# Patient Record
Sex: MALE | Race: OTHER | URBAN - METROPOLITAN AREA
[De-identification: names, ages, dates, MRNs, and addresses within clinical notes are randomized per-mention and may not be internally consistent; named-entity substitution may affect disease eponyms.]

---

## 2021-05-18 ENCOUNTER — HOSPITAL ENCOUNTER (INPATIENT)
Age: 31
LOS: 1 days | Discharge: HOME | DRG: 310 | End: 2021-05-19
Attending: EMERGENCY MEDICINE | Admitting: FAMILY MEDICINE

## 2021-05-18 ENCOUNTER — APPOINTMENT (OUTPATIENT)
Dept: GENERAL RADIOLOGY | Age: 31
DRG: 310 | End: 2021-05-18
Attending: PHYSICIAN ASSISTANT

## 2021-05-18 ENCOUNTER — APPOINTMENT (OUTPATIENT)
Dept: CT IMAGING | Age: 31
DRG: 310 | End: 2021-05-18
Attending: PHYSICIAN ASSISTANT

## 2021-05-18 DIAGNOSIS — R55 SYNCOPE AND COLLAPSE: ICD-10-CM

## 2021-05-18 DIAGNOSIS — R07.9 CHEST PAIN, UNSPECIFIED TYPE: Primary | ICD-10-CM

## 2021-05-18 LAB
ALBUMIN SERPL-MCNC: 4.3 G/DL (ref 3.5–5)
ALBUMIN/GLOB SERPL: 1.1 {RATIO} (ref 1.1–2.2)
ALP SERPL-CCNC: 98 U/L (ref 45–117)
ALT SERPL-CCNC: 42 U/L (ref 12–78)
ANION GAP SERPL CALC-SCNC: 8 MMOL/L (ref 5–15)
AST SERPL-CCNC: 29 U/L (ref 15–37)
ATRIAL RATE: 85 BPM
BASOPHILS # BLD: 0.1 K/UL (ref 0–0.1)
BASOPHILS NFR BLD: 1 % (ref 0–1)
BILIRUB SERPL-MCNC: 0.7 MG/DL (ref 0.2–1)
BUN SERPL-MCNC: 16 MG/DL (ref 6–20)
BUN/CREAT SERPL: 15 (ref 12–20)
CALCIUM SERPL-MCNC: 9.3 MG/DL (ref 8.5–10.1)
CALCULATED P AXIS, ECG09: 34 DEGREES
CALCULATED R AXIS, ECG10: 60 DEGREES
CALCULATED T AXIS, ECG11: 20 DEGREES
CHLORIDE SERPL-SCNC: 103 MMOL/L (ref 97–108)
CK SERPL-CCNC: 221 U/L (ref 39–308)
CO2 SERPL-SCNC: 25 MMOL/L (ref 21–32)
COMMENT, HOLDF: NORMAL
CREAT SERPL-MCNC: 1.08 MG/DL (ref 0.7–1.3)
DIAGNOSIS, 93000: NORMAL
DIFFERENTIAL METHOD BLD: ABNORMAL
EOSINOPHIL # BLD: 0 K/UL (ref 0–0.4)
EOSINOPHIL NFR BLD: 0 % (ref 0–7)
ERYTHROCYTE [DISTWIDTH] IN BLOOD BY AUTOMATED COUNT: 11.9 % (ref 11.5–14.5)
GLOBULIN SER CALC-MCNC: 3.9 G/DL (ref 2–4)
GLUCOSE SERPL-MCNC: 100 MG/DL (ref 65–100)
HCT VFR BLD AUTO: 45.4 % (ref 36.6–50.3)
HGB BLD-MCNC: 16.1 G/DL (ref 12.1–17)
IMM GRANULOCYTES # BLD AUTO: 0 K/UL (ref 0–0.04)
IMM GRANULOCYTES NFR BLD AUTO: 0 % (ref 0–0.5)
LYMPHOCYTES # BLD: 1.8 K/UL (ref 0.8–3.5)
LYMPHOCYTES NFR BLD: 17 % (ref 12–49)
MCH RBC QN AUTO: 31.6 PG (ref 26–34)
MCHC RBC AUTO-ENTMCNC: 35.5 G/DL (ref 30–36.5)
MCV RBC AUTO: 89 FL (ref 80–99)
MONOCYTES # BLD: 0.6 K/UL (ref 0–1)
MONOCYTES NFR BLD: 6 % (ref 5–13)
NEUTS SEG # BLD: 7.9 K/UL (ref 1.8–8)
NEUTS SEG NFR BLD: 76 % (ref 32–75)
NRBC # BLD: 0 K/UL (ref 0–0.01)
NRBC BLD-RTO: 0 PER 100 WBC
P-R INTERVAL, ECG05: 138 MS
PLATELET # BLD AUTO: 217 K/UL (ref 150–400)
PMV BLD AUTO: 10.1 FL (ref 8.9–12.9)
POTASSIUM SERPL-SCNC: 3.4 MMOL/L (ref 3.5–5.1)
PROT SERPL-MCNC: 8.2 G/DL (ref 6.4–8.2)
Q-T INTERVAL, ECG07: 350 MS
QRS DURATION, ECG06: 92 MS
QTC CALCULATION (BEZET), ECG08: 416 MS
RBC # BLD AUTO: 5.1 M/UL (ref 4.1–5.7)
SAMPLES BEING HELD,HOLD: NORMAL
SODIUM SERPL-SCNC: 136 MMOL/L (ref 136–145)
TROPONIN I SERPL-MCNC: <0.05 NG/ML
VENTRICULAR RATE, ECG03: 85 BPM
WBC # BLD AUTO: 10.5 K/UL (ref 4.1–11.1)

## 2021-05-18 PROCEDURE — 84484 ASSAY OF TROPONIN QUANT: CPT

## 2021-05-18 PROCEDURE — 82550 ASSAY OF CK (CPK): CPT

## 2021-05-18 PROCEDURE — 74011250637 HC RX REV CODE- 250/637: Performed by: PHYSICIAN ASSISTANT

## 2021-05-18 PROCEDURE — 80053 COMPREHEN METABOLIC PANEL: CPT

## 2021-05-18 PROCEDURE — 70450 CT HEAD/BRAIN W/O DYE: CPT

## 2021-05-18 PROCEDURE — 71046 X-RAY EXAM CHEST 2 VIEWS: CPT

## 2021-05-18 PROCEDURE — 74011000636 HC RX REV CODE- 636: Performed by: EMERGENCY MEDICINE

## 2021-05-18 PROCEDURE — 71275 CT ANGIOGRAPHY CHEST: CPT

## 2021-05-18 PROCEDURE — 74011250636 HC RX REV CODE- 250/636: Performed by: PHYSICIAN ASSISTANT

## 2021-05-18 PROCEDURE — 93005 ELECTROCARDIOGRAM TRACING: CPT

## 2021-05-18 PROCEDURE — 36415 COLL VENOUS BLD VENIPUNCTURE: CPT

## 2021-05-18 PROCEDURE — 85025 COMPLETE CBC W/AUTO DIFF WBC: CPT

## 2021-05-18 PROCEDURE — 99285 EMERGENCY DEPT VISIT HI MDM: CPT

## 2021-05-18 RX ORDER — ACETAMINOPHEN 325 MG/1
650 TABLET ORAL
Status: COMPLETED | OUTPATIENT
Start: 2021-05-18 | End: 2021-05-18

## 2021-05-18 RX ADMIN — ACETAMINOPHEN 650 MG: 325 TABLET ORAL at 19:53

## 2021-05-18 RX ADMIN — IOPAMIDOL 100 ML: 755 INJECTION, SOLUTION INTRAVENOUS at 15:42

## 2021-05-18 RX ADMIN — SODIUM CHLORIDE 1000 ML: 9 INJECTION, SOLUTION INTRAVENOUS at 16:06

## 2021-05-18 NOTE — ED PROVIDER NOTES
Sofia Hanson is a 32 y.o. male without PMhx who presents via EMS to ED with cc of 6/10 R sided CP x today, approx 10 minutes after eating greasy lunch from a food truck. Went back to work despite CP, notes he was in the sun, states he felt poorly and felt like his vision went black. States he was able to sit down and his coworkers helped him not fall. Denies head injury or neck injury. States he lost consciousness for short amount of time. Denies nausea, vomiting, palpitations, loss of control of bowels or bladder. Unsure if he had any seizure like activity. Endorses generalized myalgias, some mild SOB and some R sided CP currently. Notes current pain is improved from previous. States he had a similar episode in the past witnessed by his sister. Pt denies additional symptoms of F/C, cough, congestion, urinary or fecal changes, visual changes, neck pain, wound. PMHx: Pt denies. PSHx: Pt denies. PCP: No primary care provider on file. There are no other complaints verbalized at this time. : [id number]           History reviewed. No pertinent past medical history. History reviewed. No pertinent surgical history. History reviewed. No pertinent family history.     Social History     Socioeconomic History    Marital status: SINGLE     Spouse name: Not on file    Number of children: Not on file    Years of education: Not on file    Highest education level: Not on file   Occupational History    Not on file   Social Needs    Financial resource strain: Not on file    Food insecurity     Worry: Not on file     Inability: Not on file    Transportation needs     Medical: Not on file     Non-medical: Not on file   Tobacco Use    Smoking status: Not on file   Substance and Sexual Activity    Alcohol use: Not on file    Drug use: Not on file    Sexual activity: Not on file   Lifestyle    Physical activity     Days per week: Not on file     Minutes per session: Not on file    Stress: Not on file   Relationships    Social connections     Talks on phone: Not on file     Gets together: Not on file     Attends Moravian service: Not on file     Active member of club or organization: Not on file     Attends meetings of clubs or organizations: Not on file     Relationship status: Not on file    Intimate partner violence     Fear of current or ex partner: Not on file     Emotionally abused: Not on file     Physically abused: Not on file     Forced sexual activity: Not on file   Other Topics Concern    Not on file   Social History Narrative    Not on file         ALLERGIES: Patient has no known allergies. Review of Systems   Constitutional: Negative for chills and fever. HENT: Negative for congestion. Eyes: Negative for visual disturbance. Respiratory: Positive for shortness of breath. Negative for cough. Cardiovascular: Positive for chest pain. Negative for palpitations. Gastrointestinal: Negative for constipation and diarrhea. Genitourinary: Negative for difficulty urinating. Musculoskeletal: Negative for neck pain. Skin: Negative for wound. Neurological: Positive for syncope (possible LOC) and light-headedness. All other systems reviewed and are negative. Vitals:    05/18/21 1357 05/18/21 1844   BP: 132/86 134/80   Pulse: 88 66   Resp: 15 15   Temp: 98.4 °F (36.9 °C)    SpO2: 96% 100%            Physical Exam  Vitals signs and nursing note reviewed. Constitutional:       General: He is not in acute distress. Appearance: He is not diaphoretic. HENT:      Head: Normocephalic. Comments: No ttp head     Right Ear: External ear normal.      Left Ear: External ear normal.      Mouth/Throat:      Mouth: Mucous membranes are moist.   Eyes:      General: No scleral icterus. Extraocular Movements: Extraocular movements intact. Conjunctiva/sclera: Conjunctivae normal.      Pupils: Pupils are equal, round, and reactive to light.    Neck:      Musculoskeletal: Normal range of motion and neck supple. No neck rigidity or muscular tenderness. Cardiovascular:      Rate and Rhythm: Normal rate and regular rhythm. Pulses: Normal pulses. Heart sounds: Normal heart sounds. No murmur. Comments: 2+ radial and posterior tibial pulses  Pulmonary:      Effort: Pulmonary effort is normal. No respiratory distress. Breath sounds: Normal breath sounds. No stridor. No wheezing, rhonchi or rales. Chest:      Chest wall: No tenderness. Abdominal:      General: Bowel sounds are normal. There is no distension. Palpations: Abdomen is soft. There is no mass. Tenderness: There is no abdominal tenderness. There is no guarding or rebound. Hernia: No hernia is present. Musculoskeletal:         General: No swelling or deformity. Skin:     General: Skin is warm and dry. Capillary Refill: Capillary refill takes less than 2 seconds. Neurological:      General: No focal deficit present. Mental Status: He is alert and oriented to person, place, and time. Mental status is at baseline. GCS: GCS eye subscore is 4. GCS verbal subscore is 5. GCS motor subscore is 6. Cranial Nerves: No cranial nerve deficit (2-12), dysarthria or facial asymmetry. Sensory: No sensory deficit (grossly intact to BLUE and BLLE). Motor: No weakness (5/5 strength to biceps, triceps,  strength, flexion/extension knees and ankles), tremor, abnormal muscle tone or seizure activity. Coordination: Heel to Shin Test normal.          MDM  Number of Diagnoses or Management Options     Amount and/or Complexity of Data Reviewed  Clinical lab tests: ordered and reviewed  Tests in the radiology section of CPT®: ordered and reviewed  Tests in the medicine section of CPT®: ordered and reviewed  Discuss the patient with other providers: yes (Dr Savanah Morales, ED attending)           Procedures          3:24 PM  Spoke with Dr Savanah Morales regarding story.  He recommends adding on CTA chest.      4:52 PM  Pt re-evaluated. He is resting comfortably, fluids are running.      5:26 PM  Called the lab with note that CK and Troponin are acknowledged but have not yet resulted. They state that neither have been received by processing. States she will take care of that now. 7:37 PM  In to reevaluate patient. He states his chest pain has resolved and now just endorses some generalized myalgias.  #[id number] use during evaluation. Perfect Serve Consult for Admission  7:38 PM    ED Room Number: R33/R33  Patient Name and age:  Marley Born 32 y.o.  male  Working Diagnosis:   1. Chest pain, unspecified type    2. Syncope and collapse        COVID-19 Suspicion:  no  Sepsis present:  no  Reassessment needed: yes  Code Status:  Full Code  Readmission: no  Isolation Requirements:  no  Recommended Level of Care:  telemetry  Department:[facility] Adult ED - 21   Other: 26-year-old male, denies past medical history. Was working earlier today, had onset of R-sided chest pain after lunch, went back to work and had syncopal episode. No loss of bowel or bladder control with syncopal episode. Continued to have CP for which he came to ED. Notes history of similar in the past for which he has not had evaluation/diagnosis. Currently notes pain is improved and just endorses myalgias. EKG, CBC, CMP, CK, troponin, head CT without, CTA obtained. Was given liter of fluids. Spoke with attending who recommends admission given combination of chest pain and syncope.         VITAL SIGNS:  Vitals:    05/18/21 1357 05/18/21 1844   BP: 132/86 134/80   Pulse: 88 66   Resp: 15 15   Temp: 98.4 °F (36.9 °C)    SpO2: 96% 100%         LABS:  Recent Results (from the past 24 hour(s))   EKG, 12 LEAD, INITIAL    Collection Time: 05/18/21  2:31 PM   Result Value Ref Range    Ventricular Rate 85 BPM    Atrial Rate 85 BPM    P-R Interval 138 ms    QRS Duration 92 ms    Q-T Interval 350 ms    QTC Calculation (Bezet) 416 ms    Calculated P Axis 34 degrees    Calculated R Axis 60 degrees    Calculated T Axis 20 degrees    Diagnosis       Normal sinus rhythm  No previous ECGs available  Confirmed by Adair Kee M.D., Arlice Chase ([id number]) on 5/18/2021 4:58:20 PM     CBC WITH AUTOMATED DIFF    Collection Time: 05/18/21  2:33 PM   Result Value Ref Range    WBC 10.5 4.1 - 11.1 K/uL    RBC 5.10 4. 10 - 5.70 M/uL    HGB 16.1 12.1 - 17.0 g/dL    HCT 45.4 36.6 - 50.3 %    MCV 89.0 80.0 - 99.0 FL    MCH 31.6 26.0 - 34.0 PG    MCHC 35.5 30.0 - 36.5 g/dL    RDW 11.9 11.5 - 14.5 %    PLATELET 807 030 - 080 K/uL    MPV 10.1 8.9 - 12.9 FL    NRBC 0.0 0  WBC    ABSOLUTE NRBC 0.00 0.00 - 0.01 K/uL    NEUTROPHILS 76 (H) 32 - 75 %    LYMPHOCYTES 17 12 - 49 %    MONOCYTES 6 5 - 13 %    EOSINOPHILS 0 0 - 7 %    BASOPHILS 1 0 - 1 %    IMMATURE GRANULOCYTES 0 0.0 - 0.5 %    ABS. NEUTROPHILS 7.9 1.8 - 8.0 K/UL    ABS. LYMPHOCYTES 1.8 0.8 - 3.5 K/UL    ABS. MONOCYTES 0.6 0.0 - 1.0 K/UL    ABS. EOSINOPHILS 0.0 0.0 - 0.4 K/UL    ABS. BASOPHILS 0.1 0.0 - 0.1 K/UL    ABS. IMM. GRANS. 0.0 0.00 - 0.04 K/UL    DF AUTOMATED     METABOLIC PANEL, COMPREHENSIVE    Collection Time: 05/18/21  2:33 PM   Result Value Ref Range    Sodium 136 136 - 145 mmol/L    Potassium 3.4 (L) 3.5 - 5.1 mmol/L    Chloride 103 97 - 108 mmol/L    CO2 25 21 - 32 mmol/L    Anion gap 8 5 - 15 mmol/L    Glucose 100 65 - 100 mg/dL    BUN 16 6 - 20 MG/DL    Creatinine 1.08 0.70 - 1.30 MG/DL    BUN/Creatinine ratio 15 12 - 20      GFR est AA >60 >60 ml/min/1.73m2    GFR est non-AA >60 >60 ml/min/1.73m2    Calcium 9.3 8.5 - 10.1 MG/DL    Bilirubin, total 0.7 0.2 - 1.0 MG/DL    ALT (SGPT) 42 12 - 78 U/L    AST (SGOT) 29 15 - 37 U/L    Alk.  phosphatase 98 45 - 117 U/L    Protein, total 8.2 6.4 - 8.2 g/dL    Albumin 4.3 3.5 - 5.0 g/dL    Globulin 3.9 2.0 - 4.0 g/dL    A-G Ratio 1.1 1.1 - 2.2     SAMPLES BEING HELD    Collection Time: 05/18/21  2:33 PM   Result Value Ref Range    SAMPLES BEING HELD 1 red, 1 daisy     COMMENT        Add-on orders for these samples will be processed based on acceptable specimen integrity and analyte stability, which may vary by analyte. TROPONIN I    Collection Time: 05/18/21  3:15 PM   Result Value Ref Range    Troponin-I, Qt. <0.05 <0.05 ng/mL   CK W/ REFLX CKMB    Collection Time: 05/18/21  3:15 PM   Result Value Ref Range     39 - 308 U/L         IMAGING:  CT HEAD WO CONT   Final Result   No acute intracranial finding. CTA CHEST W OR W WO CONT   Final Result   1. No pulmonary emboli. 2. No acute finding. XR CHEST PA LAT   Final Result   Normal chest.            Medications During Visit:  Medications   sodium chloride 0.9 % bolus infusion 1,000 mL (0 mL IntraVENous IV Completed 5/18/21 1800)   iopamidoL (ISOVUE-370) 76 % injection 100 mL (100 mL IntraVENous Given 5/18/21 1542)   acetaminophen (TYLENOL) tablet 650 mg (650 mg Oral Given 5/18/21 1953)         DECISION MAKING:    Elizabeth Jorgensen is a 32 y.o. male without major PMHx reported who comes in as above. He presents afebrile and hemodynamically stable. Had onset of R sided chest pain after eating lunch and then had loss of consciousness. Denies fall, head injury or neck injury. Upon presentation, endorses continued R sided CP and generalized myalgias. Encouraging neuro exam on PE and he has no chest or abd ttp, is able to take deep inspirations without difficulty and has stable vital signs. CBC, CMP obtained without acute electrolyte, anemia or other etiology. CK obtained secondary generalized myalgias, however no elevation noted, he has been given with fluids while in ED since he stated he was in the sun earlier. Troponin without elevation and EKG without acute arrythmia or other etiology noted. CT head without apparent mass, bleed, fluid collection or shift. CTA chest obtained secondary to combination of CP and syncope to r/o PE, demonstrating no PE or other acute cardiopulmonary finding.  Have spoke with ED attending, Dr Solomon Clark who recommends admission given combination of syncope and chest pain. Spoke with patient who is agreeable. Hospitalist consulted. IMPRESSION:  1. Chest pain, unspecified type    2. Syncope and collapse        DISPOSITION:  Admission. Please note that this dictation was completed with [employer], the computer voice recognition software. Quite often unanticipated grammatical, syntax, homophones, and other interpretive errors are inadvertently transcribed by the computer software. Please disregard these errors. Please excuse any errors that have escaped final proofreading.

## 2021-05-18 NOTE — ED TRIAGE NOTES
Patient presents from work via EMS with complaints of right side chest pain that statared after he ate lunch today. Patient reports he has had this issue for about a year off and on. Patient reports he ate greasy food from a food truck.  Denies SOB, N/V/D

## 2021-05-19 ENCOUNTER — APPOINTMENT (OUTPATIENT)
Dept: NON INVASIVE DIAGNOSTICS | Age: 31
DRG: 310 | End: 2021-05-19
Attending: FAMILY MEDICINE

## 2021-05-19 VITALS
DIASTOLIC BLOOD PRESSURE: 95 MMHG | TEMPERATURE: 98.4 F | RESPIRATION RATE: 22 BRPM | SYSTOLIC BLOOD PRESSURE: 162 MMHG | HEART RATE: 77 BPM | OXYGEN SATURATION: 94 %

## 2021-05-19 PROBLEM — R55 SYNCOPE: Status: ACTIVE | Noted: 2021-05-19

## 2021-05-19 PROCEDURE — 65660000000 HC RM CCU STEPDOWN

## 2021-05-19 PROCEDURE — 95816 EEG AWAKE AND DROWSY: CPT | Performed by: INTERNAL MEDICINE

## 2021-05-19 PROCEDURE — 74011250636 HC RX REV CODE- 250/636: Performed by: FAMILY MEDICINE

## 2021-05-19 PROCEDURE — 95819 EEG AWAKE AND ASLEEP: CPT | Performed by: PSYCHIATRY & NEUROLOGY

## 2021-05-19 RX ORDER — ACETAMINOPHEN 650 MG/1
650 SUPPOSITORY RECTAL
Status: DISCONTINUED | OUTPATIENT
Start: 2021-05-19 | End: 2021-05-19 | Stop reason: HOSPADM

## 2021-05-19 RX ORDER — POLYETHYLENE GLYCOL 3350 17 G/17G
17 POWDER, FOR SOLUTION ORAL DAILY PRN
Status: DISCONTINUED | OUTPATIENT
Start: 2021-05-19 | End: 2021-05-19 | Stop reason: HOSPADM

## 2021-05-19 RX ORDER — SODIUM CHLORIDE 0.9 % (FLUSH) 0.9 %
5-40 SYRINGE (ML) INJECTION AS NEEDED
Status: DISCONTINUED | OUTPATIENT
Start: 2021-05-19 | End: 2021-05-19 | Stop reason: HOSPADM

## 2021-05-19 RX ORDER — SODIUM CHLORIDE 0.9 % (FLUSH) 0.9 %
5-40 SYRINGE (ML) INJECTION EVERY 8 HOURS
Status: DISCONTINUED | OUTPATIENT
Start: 2021-05-19 | End: 2021-05-19 | Stop reason: HOSPADM

## 2021-05-19 RX ORDER — ENOXAPARIN SODIUM 100 MG/ML
40 INJECTION SUBCUTANEOUS DAILY
Status: DISCONTINUED | OUTPATIENT
Start: 2021-05-19 | End: 2021-05-19 | Stop reason: HOSPADM

## 2021-05-19 RX ORDER — ONDANSETRON 2 MG/ML
4 INJECTION INTRAMUSCULAR; INTRAVENOUS
Status: DISCONTINUED | OUTPATIENT
Start: 2021-05-19 | End: 2021-05-19 | Stop reason: HOSPADM

## 2021-05-19 RX ORDER — ACETAMINOPHEN 325 MG/1
650 TABLET ORAL
Status: DISCONTINUED | OUTPATIENT
Start: 2021-05-19 | End: 2021-05-19 | Stop reason: HOSPADM

## 2021-05-19 RX ORDER — PROMETHAZINE HYDROCHLORIDE 25 MG/1
12.5 TABLET ORAL
Status: DISCONTINUED | OUTPATIENT
Start: 2021-05-19 | End: 2021-05-19 | Stop reason: HOSPADM

## 2021-05-19 RX ADMIN — Medication 10 ML: at 06:00

## 2021-05-19 RX ADMIN — Medication 10 ML: at 01:00

## 2021-05-19 NOTE — PROGRESS NOTES
Patient is requesting to leave against medical advice. Patient refuses all further meds/laboratory draws/diagnostic tests. MD notified. Patient is requesting to leave due to not being able to afford the hospital bill. Case management at bedside offering resources.  Patient still wanting to leave by no later than 1pm.

## 2021-05-19 NOTE — PROGRESS NOTES
TRANSITION OF CARE  RUR--&%  Disposition--Patient plans to leave AMA. Return home to independent functioning. No discharge planning needs presented at this time. Transport--will drive himself. Patient's primary family contact: sister Jamilah Ledesma cell [phone]    Care Management Interventions  PCP Verified by CM: No  Transition of Care Consult (CM Consult): Other (None)  Physical Therapy Consult: No  Occupational Therapy Consult: No  Speech Therapy Consult: No  Current Support Network: Lives Alone  The Plan for Transition of Care is Related to the Following Treatment Goals : Return to independent functioning. Discharge Location  Discharge Placement: Home    Reason for Admission:  Atypical CP --resolved. Possible seizure disorder. RUR Score:            7%           Plan for utilizing home health:    None      PCP: First and Last name:  None   Name of Practice:    Are you a current patient: Yes/No:    Approximate date of last visit:    Can you participate in a virtual visit with your PCP:                     Current Advanced Directive/Advance Care Plan: Full Code      Healthcare Decision Maker:   Click here to complete [address] including selection of the Healthcare Decision Maker Relationship (ie \"Primary\")                             Transition of Care Plan:                    CM and staff nurse spoke with patient utilizing [facility]  via [employer]. Patient lives in [place] where his family/socvial support is located. Patient reports good family /social support. Patient is ambulatory and expects return to independent functioning and employment. Patient does not have health insurance nor primary care. Patient said that his employer did not offer him health insurance. CM recommended patient access a free clinic in his area of [place].    CM provided patient with a copy of the Financial Aide Application for the [facility] Card (Bengali version), as well as a brief explanation of where to call if assistance is needed completing the form and where to mail the form when completed.

## 2021-05-19 NOTE — ROUTINE PROCESS
TRANSFER - OUT REPORT: 
 
Verbal report given to NELIDA Cabrales(name) on [place]  being transferred to [address] [place] [address](unit) for routine progression of care Report consisted of patients Situation, Background, Assessment and  
Recommendations(SBAR). Information from the following report(s) SBAR, ED Summary, Intake/Output, MAR and Recent Results was reviewed with the receiving nurse. Lines:  
Peripheral IV 05/18/21 Left Arm (Active) Site Assessment Clean, dry, & intact 05/18/21 1436 Phlebitis Assessment 0 05/18/21 1436 Infiltration Assessment 0 05/18/21 1436 Dressing Status Clean, dry, & intact 05/18/21 1436 Dressing Type Transparent 05/18/21 1436 Hub Color/Line Status Pink 05/18/21 1436 Opportunity for questions and clarification was provided. Patient transported with: transport

## 2021-05-19 NOTE — PROGRESS NOTES
Problem: Falls - Risk of  Goal: *Absence of Falls  Description: Document Nellie Gunderson Fall Risk and appropriate interventions in the flowsheet.   Outcome: Progressing Towards Goal  Note: Fall Risk Interventions:                                Problem: Patient Education: Go to Patient Education Activity  Goal: Patient/Family Education  Outcome: Progressing Towards Goal

## 2021-05-19 NOTE — PROCEDURES
[facility]  EEG    Name:  Fuentes Shipley  MR#:  [id number]  :  1990  ACCOUNT #:  [de-identified]  DATE OF SERVICE:  2021    REQUESTING PHYSICIAN:  Isabelle Cox MD    HISTORY:  The patient is a 70-year-old male who is being evaluated after an episode of loss of consciousness. DESCRIPTION:  This is an 18-channel EEG performed on an awake, drowsy and asleep patient. During wakefulness, the dominant posterior background rhythm consists of symmetric well-modulated medium voltage rhythms in the 8 Hz frequency range out of the posterior head region. Faster frequencies and muscle artifact are seen in the frontal areas. Drowsiness is characterized by slowing and vertex waves. Stage II non-REM sleep reveals symmetric sleep spindles. Photic stimulation elicits a symmetric driving response. Hyperventilation was not performed. EEG SUMMARY:  Normal study. CLINICAL INTERPRETATION:  This is a normal EEG during awake, drowsy and asleep states of the patient. No lateralizing or epileptiform features were noted.       MD ELY Macario/S_[email or web address]_/[email or web address]  D:  2021 13:36  T:  2021 15:42  JOB #:  [id number]

## 2021-05-19 NOTE — H&P
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 Seems the pt also cp initially that resolved quickly. No established medical condition no meds. Smoke a little. [place] no alcohol, no know COVID exposure, no friends only co-workers and not sure if exposed to COVID      No fever, no chills, no cough, no sob; no n/v, no urine  incontinence        Prior to Admission Med List  None       SH:  Social History     Socioeconomic History    Marital status: SINGLE     Spouse name: Not on file    Number of children: Not on file    Years of education: Not on file    Highest education level: Not on file   Occupational History    Not on file   Tobacco Use    Smoking status: Not on file   Substance and Sexual Activity    Alcohol use: Not on file    Drug use: Not on file    Sexual activity: Not on file   Other Topics Concern    Not on file   Social History Narrative    Not on file     Social Determinants of Health     Financial Resource Strain:     Difficulty of Paying Living Expenses:    Food Insecurity:     Worried About Running Out of Food in the Last Year:     [address] in the Last Year:    Transportation Needs:     Lack of Transportation (Medical):      Lack of Transportation (Non-Medical):    Physical Activity:     Days of Exercise per Week:     Minutes of Exercise per Session:    Stress:     Feeling of Stress :    Social Connections:     Frequency of Communication with Friends and Family:     Frequency of Social Gatherings with Friends and Family:     Attends Episcopal Services:     Active Member of Clubs or Organizations:     Attends Club or Organization Meetings:     Marital Status:    Intimate Partner Violence:     Fear of Current or Ex-Partner:     Emotionally Abused:     Physically Abused:     Sexually Abused:           PMH:     History reviewed. No pertinent past medical history. Medicine PTA:     No medications prior to admission. ROS:   Review of Systems   Constitutional: Negative. HENT: Negative. Respiratory: Negative. Cardiovascular: Negative. Gastrointestinal: Negative. Genitourinary: Negative. Musculoskeletal: Negative. Skin: Negative. Neurological: Negative. Psychiatric/Behavioral: Negative. PE:     Visit Vitals  /64 (BP 1 Location: Right upper arm, BP Patient Position: Standing)   Pulse 86   Temp 98.4 °F (36.9 °C)   Resp 18   SpO2 95%        Physical Exam  Constitutional:       Appearance: Normal appearance. He is obese. HENT:      Head: Normocephalic and atraumatic. Right Ear: External ear normal.      Left Ear: External ear normal.      Nose: Nose normal.      Mouth/Throat:      Mouth: Mucous membranes are moist.      Pharynx: Oropharynx is clear. Eyes:      Extraocular Movements: Extraocular movements intact. Conjunctiva/sclera: Conjunctivae normal.      Pupils: Pupils are equal, round, and reactive to light. Cardiovascular:      Rate and Rhythm: Normal rate and regular rhythm. Pulses: Normal pulses. Heart sounds: Normal heart sounds. Pulmonary:      Effort: Pulmonary effort is normal.      Breath sounds: Normal breath sounds. Abdominal:      General: Abdomen is flat. There is no distension. Palpations: Abdomen is soft. Musculoskeletal:         General: No swelling or tenderness. Cervical back: Normal range of motion and neck supple. Skin:     General: Skin is warm and dry. Neurological:      General: No focal deficit present. Mental Status: He is oriented to person, place, and time. Mental status is at baseline.    Psychiatric: Mood and Affect: Mood normal.         Thought Content: Thought content normal.            Data:   Lab Results   Component Value Date/Time    WBC 10.5 05/18/2021 02:33 PM    HGB 16.1 05/18/2021 02:33 PM    HCT 45.4 05/18/2021 02:33 PM    PLATELET 641 44/77/1351 02:33 PM    MCV 89.0 05/18/2021 02:33 PM        Lab Results   Component Value Date/Time    Sodium 136 05/18/2021 02:33 PM    Potassium 3.4 (L) 05/18/2021 02:33 PM    Chloride 103 05/18/2021 02:33 PM    CO2 25 05/18/2021 02:33 PM    Anion gap 8 05/18/2021 02:33 PM    Glucose 100 05/18/2021 02:33 PM    BUN 16 05/18/2021 02:33 PM    Creatinine 1.08 05/18/2021 02:33 PM    BUN/Creatinine ratio 15 05/18/2021 02:33 PM    GFR est AA >60 05/18/2021 02:33 PM    GFR est non-AA >60 05/18/2021 02:33 PM    Calcium 9.3 05/18/2021 02:33 PM    Bilirubin, total 0.7 05/18/2021 02:33 PM    Alk. phosphatase 98 05/18/2021 02:33 PM    Protein, total 8.2 05/18/2021 02:33 PM    Albumin 4.3 05/18/2021 02:33 PM    Globulin 3.9 05/18/2021 02:33 PM    A-G Ratio 1.1 05/18/2021 02:33 PM    ALT (SGPT) 42 05/18/2021 02:33 PM        EKG Results     Procedure 720 Value Units Date/Time    EKG 12 LEAD INITIAL [[id number]] Collected: 05/18/21 1431    Order Status: Completed Updated: 05/18/21 1658     Ventricular Rate 85 BPM      Atrial Rate 85 BPM      P-R Interval 138 ms      QRS Duration 92 ms      Q-T Interval 350 ms      QTC Calculation (Bezet) 416 ms      Calculated P Axis 34 degrees      Calculated R Axis 60 degrees      Calculated T Axis 20 degrees      Diagnosis --     Normal sinus rhythm  No previous ECGs available  Confirmed by Ramon Ma M.D., Pete Randall ([id number]) on 5/18/2021 4:58:20 PM          XR Results (most recent):  Results from [place] encounter on 05/18/21    XR CHEST PA LAT    Narrative  EXAM: XR CHEST PA LAT    INDICATION: Left-sided chest pain    COMPARISON: None. FINDINGS: PA and lateral radiographs of the chest demonstrate clear lungs.  The  cardiac and mediastinal contours and pulmonary vascularity are normal. The bones  and soft tissues are within normal limits.     Impression  Normal chest.          eCG unremarkable   CT chest neg     Assessment/Plan:     · Atypical CP, resolved; echo, neg troponin, nl ECG, else no further august planned   · Possible seizure disorder for eval , eeg, neurology to weigh in   · Tobacco abuse - discussed, for nicotine patch        Mer Us MD 5/19/2021

## 2021-05-31 NOTE — DISCHARGE SUMMARY
Discharge Summary       PATIENT ID: Veda Bardales  MRN: [id number]   YOB: 1990    DATE OF ADMISSION: 5/18/2021  3:52 PM    DATE OF DISCHARGE: 5/19/2021    PRIMARY CARE PROVIDER: None     ATTENDING PHYSICIAN: Elina Galvan MD   DISCHARGING PROVIDER: Elina Galvan MD    To contact this individual call [phone] and ask the  to page. If unavailable ask to be transferred the Adult Hospitalist Department. CONSULTATIONS: None    PROCEDURES/SURGERIES: * No surgery found *    ADMITTING DIAGNOSES & HOSPITAL COURSE:   AMA discharge    Michael Kramer:     Per neurology:     HISTORY:  The patient is a 15-year-old male who is being evaluated after an episode of loss of consciousness.     DESCRIPTION:  This is an 18-channel EEG performed on an awake, drowsy and asleep patient. During wakefulness, the dominant posterior background rhythm consists of symmetric well-modulated medium voltage rhythms in the 8 Hz frequency range out of the posterior head region. Faster frequencies and muscle artifact are seen in the frontal areas. Drowsiness is characterized by slowing and vertex waves. Stage II non-REM sleep reveals symmetric sleep spindles. Photic stimulation elicits a symmetric driving response. Hyperventilation was not performed.       Per h and p initial assessment 5/19:     · Atypical CP, resolved; echo, neg troponin, nl ECG, else no further august planned   · Possible seizure disorder for eval , eeg, neurology to weigh in   · Tobacco abuse - discussed, for nicotine patch         PT LEAVES AMA     DISCHARGE DIAGNOSES / PLAN:      1.  as above           PT LEAVES AMA   Greater than 20  minutes were spent with the patient on counseling and coordination of care    Signed:   Elina Galvan MD  5/31/2021  4:52 AM